# Patient Record
Sex: MALE | Race: WHITE | NOT HISPANIC OR LATINO | URBAN - NONMETROPOLITAN AREA
[De-identification: names, ages, dates, MRNs, and addresses within clinical notes are randomized per-mention and may not be internally consistent; named-entity substitution may affect disease eponyms.]

---

## 2021-03-19 ENCOUNTER — HOSPITAL ENCOUNTER (INPATIENT)
Facility: HOSPITAL | Age: 41
LOS: 2 days | Discharge: HOME | End: 2021-03-21
Attending: FAMILY MEDICINE | Admitting: FAMILY MEDICINE

## 2021-03-19 DIAGNOSIS — R13.10 DYSPHAGIA, UNSPECIFIED TYPE: Primary | ICD-10-CM

## 2021-03-19 DIAGNOSIS — R47.01 EXPRESSIVE APHASIA: ICD-10-CM

## 2021-03-20 ENCOUNTER — APPOINTMENT (OUTPATIENT)
Dept: ULTRASOUND IMAGING | Facility: HOSPITAL | Age: 41
End: 2021-03-20

## 2021-03-20 ENCOUNTER — APPOINTMENT (OUTPATIENT)
Dept: CARDIOLOGY | Facility: HOSPITAL | Age: 41
End: 2021-03-20

## 2021-03-20 ENCOUNTER — APPOINTMENT (OUTPATIENT)
Dept: MRI IMAGING | Facility: HOSPITAL | Age: 41
End: 2021-03-20

## 2021-03-20 PROBLEM — E78.2 MIXED HYPERLIPIDEMIA: Status: ACTIVE | Noted: 2021-03-20

## 2021-03-20 PROBLEM — E66.01 MORBID OBESITY (HCC): Status: ACTIVE | Noted: 2021-03-20

## 2021-03-20 PROBLEM — R47.01 EXPRESSIVE APHASIA: Status: ACTIVE | Noted: 2021-03-20

## 2021-03-20 PROBLEM — I63.9 STROKE (HCC): Status: ACTIVE | Noted: 2021-03-20

## 2021-03-20 LAB
ALBUMIN SERPL-MCNC: 3.7 G/DL (ref 3.5–5.2)
ALBUMIN/GLOB SERPL: 1.2 G/DL
ALP SERPL-CCNC: 51 U/L (ref 39–117)
ALT SERPL W P-5'-P-CCNC: 37 U/L (ref 1–41)
AMPHET+METHAMPHET UR QL: NEGATIVE
AMPHETAMINES UR QL: NEGATIVE
ANION GAP SERPL CALCULATED.3IONS-SCNC: 8 MMOL/L (ref 5–15)
AST SERPL-CCNC: 35 U/L (ref 1–40)
BARBITURATES UR QL SCN: NEGATIVE
BENZODIAZ UR QL SCN: NEGATIVE
BH CV ECHO MEAS - AO MAX PG (FULL): 1.6 MMHG
BH CV ECHO MEAS - AO MAX PG: 4.2 MMHG
BH CV ECHO MEAS - AO MEAN PG (FULL): 1 MMHG
BH CV ECHO MEAS - AO MEAN PG: 2 MMHG
BH CV ECHO MEAS - AO ROOT AREA (BSA CORRECTED): 1.2
BH CV ECHO MEAS - AO ROOT AREA: 9.1 CM^2
BH CV ECHO MEAS - AO ROOT DIAM: 3.4 CM
BH CV ECHO MEAS - AO V2 MAX: 103 CM/SEC
BH CV ECHO MEAS - AO V2 MEAN: 64.8 CM/SEC
BH CV ECHO MEAS - AO V2 VTI: 18.7 CM
BH CV ECHO MEAS - AVA(I,A): 2.4 CM^2
BH CV ECHO MEAS - AVA(I,D): 2.4 CM^2
BH CV ECHO MEAS - AVA(V,A): 3 CM^2
BH CV ECHO MEAS - AVA(V,D): 3 CM^2
BH CV ECHO MEAS - BSA(HAYCOCK): 3.1 M^2
BH CV ECHO MEAS - BSA: 2.9 M^2
BH CV ECHO MEAS - BZI_BMI: 46.9 KILOGRAMS/M^2
BH CV ECHO MEAS - BZI_METRIC_HEIGHT: 193 CM
BH CV ECHO MEAS - BZI_METRIC_WEIGHT: 174.6 KG
BH CV ECHO MEAS - EDV(CUBED): 99.9 ML
BH CV ECHO MEAS - EDV(MOD-SP4): 142 ML
BH CV ECHO MEAS - EDV(TEICH): 99.3 ML
BH CV ECHO MEAS - EF(CUBED): 72.7 %
BH CV ECHO MEAS - EF(MOD-SP4): 82.7 %
BH CV ECHO MEAS - EF(TEICH): 64.5 %
BH CV ECHO MEAS - ESV(CUBED): 27.3 ML
BH CV ECHO MEAS - ESV(MOD-SP4): 24.5 ML
BH CV ECHO MEAS - ESV(TEICH): 35.3 ML
BH CV ECHO MEAS - FS: 35.1 %
BH CV ECHO MEAS - IVS/LVPW: 1.8
BH CV ECHO MEAS - IVSD: 1.5 CM
BH CV ECHO MEAS - LA DIMENSION: 3.6 CM
BH CV ECHO MEAS - LA/AO: 1.1
BH CV ECHO MEAS - LAT PEAK E' VEL: 10 CM/SEC
BH CV ECHO MEAS - LV DIASTOLIC VOL/BSA (35-75): 48.5 ML/M^2
BH CV ECHO MEAS - LV MASS(C)D: 205.1 GRAMS
BH CV ECHO MEAS - LV MASS(C)DI: 70.1 GRAMS/M^2
BH CV ECHO MEAS - LV MAX PG: 2.7 MMHG
BH CV ECHO MEAS - LV MEAN PG: 1 MMHG
BH CV ECHO MEAS - LV SYSTOLIC VOL/BSA (12-30): 8.4 ML/M^2
BH CV ECHO MEAS - LV V1 MAX: 82 CM/SEC
BH CV ECHO MEAS - LV V1 MEAN: 52.4 CM/SEC
BH CV ECHO MEAS - LV V1 VTI: 11.8 CM
BH CV ECHO MEAS - LVIDD: 4.6 CM
BH CV ECHO MEAS - LVIDS: 3 CM
BH CV ECHO MEAS - LVLD AP4: 9 CM
BH CV ECHO MEAS - LVLS AP4: 6 CM
BH CV ECHO MEAS - LVOT AREA (M): 3.8 CM^2
BH CV ECHO MEAS - LVOT AREA: 3.8 CM^2
BH CV ECHO MEAS - LVOT DIAM: 2.2 CM
BH CV ECHO MEAS - LVPWD: 1.2 CM
BH CV ECHO MEAS - MED PEAK E' VEL: 8.49 CM/SEC
BH CV ECHO MEAS - MV A MAX VEL: 79.1 CM/SEC
BH CV ECHO MEAS - MV DEC TIME: 0.19 SEC
BH CV ECHO MEAS - MV E MAX VEL: 70.8 CM/SEC
BH CV ECHO MEAS - MV E/A: 0.9
BH CV ECHO MEAS - SI(AO): 58 ML/M^2
BH CV ECHO MEAS - SI(CUBED): 24.8 ML/M^2
BH CV ECHO MEAS - SI(LVOT): 15.3 ML/M^2
BH CV ECHO MEAS - SI(MOD-SP4): 40.1 ML/M^2
BH CV ECHO MEAS - SI(TEICH): 21.9 ML/M^2
BH CV ECHO MEAS - SV(AO): 169.8 ML
BH CV ECHO MEAS - SV(CUBED): 72.6 ML
BH CV ECHO MEAS - SV(LVOT): 44.9 ML
BH CV ECHO MEAS - SV(MOD-SP4): 117.5 ML
BH CV ECHO MEAS - SV(TEICH): 64 ML
BH CV ECHO MEAS - TR MAX VEL: 125 CM/SEC
BH CV ECHO MEASUREMENTS AVERAGE E/E' RATIO: 7.66
BILIRUB SERPL-MCNC: 0.7 MG/DL (ref 0–1.2)
BUN SERPL-MCNC: 11 MG/DL (ref 6–20)
BUN/CREAT SERPL: 11.7 (ref 7–25)
BUPRENORPHINE SERPL-MCNC: NEGATIVE NG/ML
CALCIUM SPEC-SCNC: 9 MG/DL (ref 8.6–10.5)
CANNABINOIDS SERPL QL: NEGATIVE
CHLORIDE SERPL-SCNC: 106 MMOL/L (ref 98–107)
CHOLEST SERPL-MCNC: 178 MG/DL (ref 0–200)
CO2 SERPL-SCNC: 27 MMOL/L (ref 22–29)
COCAINE UR QL: NEGATIVE
CREAT SERPL-MCNC: 0.94 MG/DL (ref 0.76–1.27)
DEPRECATED RDW RBC AUTO: 44.6 FL (ref 37–54)
ERYTHROCYTE [DISTWIDTH] IN BLOOD BY AUTOMATED COUNT: 13.8 % (ref 12.3–15.4)
FERRITIN SERPL-MCNC: 291.9 NG/ML (ref 30–400)
GFR SERPL CREATININE-BSD FRML MDRD: 89 ML/MIN/1.73
GLOBULIN UR ELPH-MCNC: 3 GM/DL
GLUCOSE BLDC GLUCOMTR-MCNC: 104 MG/DL (ref 70–130)
GLUCOSE SERPL-MCNC: 112 MG/DL (ref 65–99)
HBA1C MFR BLD: 6 % (ref 4.8–5.6)
HCT VFR BLD AUTO: 37.3 % (ref 37.5–51)
HDLC SERPL-MCNC: 29 MG/DL (ref 40–60)
HGB BLD-MCNC: 11.6 G/DL (ref 13–17.7)
IRON 24H UR-MRATE: 69 MCG/DL (ref 59–158)
IRON SATN MFR SERPL: 23 % (ref 20–50)
LDLC SERPL CALC-MCNC: 122 MG/DL (ref 0–100)
LDLC/HDLC SERPL: 4.12 {RATIO}
LEFT ATRIUM VOLUME INDEX: 19.6 ML/M2
LEFT ATRIUM VOLUME: 57.3 CM3
MAXIMAL PREDICTED HEART RATE: 181 BPM
MCH RBC QN AUTO: 27.5 PG (ref 26.6–33)
MCHC RBC AUTO-ENTMCNC: 31.1 G/DL (ref 31.5–35.7)
MCV RBC AUTO: 88.4 FL (ref 79–97)
METHADONE UR QL SCN: NEGATIVE
OPIATES UR QL: NEGATIVE
OXYCODONE UR QL SCN: NEGATIVE
PCP UR QL SCN: NEGATIVE
PLATELET # BLD AUTO: 204 10*3/MM3 (ref 140–450)
PMV BLD AUTO: 9.6 FL (ref 6–12)
POTASSIUM SERPL-SCNC: 4.2 MMOL/L (ref 3.5–5.2)
PROPOXYPH UR QL: NEGATIVE
PROT SERPL-MCNC: 6.7 G/DL (ref 6–8.5)
RBC # BLD AUTO: 4.22 10*6/MM3 (ref 4.14–5.8)
SODIUM SERPL-SCNC: 141 MMOL/L (ref 136–145)
STRESS TARGET HR: 154 BPM
TIBC SERPL-MCNC: 295 MCG/DL (ref 298–536)
TRANSFERRIN SERPL-MCNC: 198 MG/DL (ref 200–360)
TRICYCLICS UR QL SCN: NEGATIVE
TRIGL SERPL-MCNC: 147 MG/DL (ref 0–150)
TSH SERPL DL<=0.05 MIU/L-ACNC: 2.92 UIU/ML (ref 0.27–4.2)
VLDLC SERPL-MCNC: 27 MG/DL (ref 5–40)
WBC # BLD AUTO: 5.58 10*3/MM3 (ref 3.4–10.8)

## 2021-03-20 PROCEDURE — 85027 COMPLETE CBC AUTOMATED: CPT | Performed by: FAMILY MEDICINE

## 2021-03-20 PROCEDURE — 93880 EXTRACRANIAL BILAT STUDY: CPT

## 2021-03-20 PROCEDURE — 84466 ASSAY OF TRANSFERRIN: CPT | Performed by: PSYCHIATRY & NEUROLOGY

## 2021-03-20 PROCEDURE — 80061 LIPID PANEL: CPT | Performed by: FAMILY MEDICINE

## 2021-03-20 PROCEDURE — 93306 TTE W/DOPPLER COMPLETE: CPT

## 2021-03-20 PROCEDURE — 82962 GLUCOSE BLOOD TEST: CPT

## 2021-03-20 PROCEDURE — 93880 EXTRACRANIAL BILAT STUDY: CPT | Performed by: SURGERY

## 2021-03-20 PROCEDURE — 92610 EVALUATE SWALLOWING FUNCTION: CPT

## 2021-03-20 PROCEDURE — 99223 1ST HOSP IP/OBS HIGH 75: CPT | Performed by: PSYCHIATRY & NEUROLOGY

## 2021-03-20 PROCEDURE — 84443 ASSAY THYROID STIM HORMONE: CPT | Performed by: PSYCHIATRY & NEUROLOGY

## 2021-03-20 PROCEDURE — 83540 ASSAY OF IRON: CPT | Performed by: PSYCHIATRY & NEUROLOGY

## 2021-03-20 PROCEDURE — 83036 HEMOGLOBIN GLYCOSYLATED A1C: CPT | Performed by: FAMILY MEDICINE

## 2021-03-20 PROCEDURE — 93306 TTE W/DOPPLER COMPLETE: CPT | Performed by: INTERNAL MEDICINE

## 2021-03-20 PROCEDURE — 25010000002 PERFLUTREN 6.52 MG/ML SUSPENSION: Performed by: FAMILY MEDICINE

## 2021-03-20 PROCEDURE — 80306 DRUG TEST PRSMV INSTRMNT: CPT | Performed by: FAMILY MEDICINE

## 2021-03-20 PROCEDURE — 82728 ASSAY OF FERRITIN: CPT | Performed by: PSYCHIATRY & NEUROLOGY

## 2021-03-20 PROCEDURE — 80053 COMPREHEN METABOLIC PANEL: CPT | Performed by: FAMILY MEDICINE

## 2021-03-20 PROCEDURE — 70551 MRI BRAIN STEM W/O DYE: CPT

## 2021-03-20 RX ORDER — ASPIRIN 81 MG/1
81 TABLET, CHEWABLE ORAL DAILY
Status: DISCONTINUED | OUTPATIENT
Start: 2021-03-20 | End: 2021-03-21 | Stop reason: HOSPADM

## 2021-03-20 RX ORDER — ACETAMINOPHEN 650 MG/1
650 SUPPOSITORY RECTAL EVERY 4 HOURS PRN
Status: DISCONTINUED | OUTPATIENT
Start: 2021-03-20 | End: 2021-03-21 | Stop reason: HOSPADM

## 2021-03-20 RX ORDER — LABETALOL HYDROCHLORIDE 5 MG/ML
20 INJECTION, SOLUTION INTRAVENOUS
Status: DISCONTINUED | OUTPATIENT
Start: 2021-03-20 | End: 2021-03-21 | Stop reason: HOSPADM

## 2021-03-20 RX ORDER — SODIUM CHLORIDE 0.9 % (FLUSH) 0.9 %
10 SYRINGE (ML) INJECTION EVERY 12 HOURS SCHEDULED
Status: DISCONTINUED | OUTPATIENT
Start: 2021-03-20 | End: 2021-03-21 | Stop reason: HOSPADM

## 2021-03-20 RX ORDER — ACETAMINOPHEN 325 MG/1
650 TABLET ORAL EVERY 4 HOURS PRN
Status: DISCONTINUED | OUTPATIENT
Start: 2021-03-20 | End: 2021-03-21 | Stop reason: HOSPADM

## 2021-03-20 RX ORDER — SODIUM CHLORIDE 0.9 % (FLUSH) 0.9 %
10 SYRINGE (ML) INJECTION AS NEEDED
Status: DISCONTINUED | OUTPATIENT
Start: 2021-03-20 | End: 2021-03-21 | Stop reason: HOSPADM

## 2021-03-20 RX ORDER — ASPIRIN 300 MG/1
300 SUPPOSITORY RECTAL DAILY
Status: DISCONTINUED | OUTPATIENT
Start: 2021-03-20 | End: 2021-03-21 | Stop reason: HOSPADM

## 2021-03-20 RX ORDER — ATORVASTATIN CALCIUM 40 MG/1
80 TABLET, FILM COATED ORAL NIGHTLY
Status: DISCONTINUED | OUTPATIENT
Start: 2021-03-20 | End: 2021-03-21 | Stop reason: HOSPADM

## 2021-03-20 RX ADMIN — PERFLUTREN 1.17 MG: 6.52 INJECTION, SUSPENSION INTRAVENOUS at 14:07

## 2021-03-20 RX ADMIN — ASPIRIN 81 MG: 81 TABLET, CHEWABLE ORAL at 12:09

## 2021-03-20 RX ADMIN — SODIUM CHLORIDE, PRESERVATIVE FREE 10 ML: 5 INJECTION INTRAVENOUS at 12:09

## 2021-03-20 RX ADMIN — SODIUM CHLORIDE, PRESERVATIVE FREE 10 ML: 5 INJECTION INTRAVENOUS at 20:06

## 2021-03-20 RX ADMIN — SODIUM CHLORIDE, PRESERVATIVE FREE 10 ML: 5 INJECTION INTRAVENOUS at 01:38

## 2021-03-20 RX ADMIN — ATORVASTATIN CALCIUM 80 MG: 40 TABLET, FILM COATED ORAL at 20:06

## 2021-03-20 NOTE — PROGRESS NOTES
[facility] Medicine Services  INPATIENT PROGRESS NOTE    Length of Stay: 1  Date of Admission: 3/19/2021  Primary Care Physician: Provider, No Known    Subjective   Chief Complaint: Follow-up aphasia  HPI   Patient resting in bed.  He is able to converse and tells me he feels his word finding difficulty is doing better.  He still has some difficulty at times.  He feels as though he knows what he needs to say but can't get the right words out.  He denies chest pain.  He states he has had some shortness of breath at times.  He denies cough, congestion, or fever.  He denies drug use or smoking history.      Review of Systems  All pertinent negatives and positives are as above. All other systems have been reviewed and are negative unless otherwise stated.     Objective    Temp:  [97.7 °F (36.5 °C)-98.2 °F (36.8 °C)] 97.7 °F (36.5 °C)  Heart Rate:  [60-85] 71  Resp:  [18-20] 18  BP: (124-156)/(56-77) 150/76  Physical Exam  Vitals and nursing note reviewed.   Constitutional:       General: He is not in acute distress.     Appearance: He is obese. He is not ill-appearing.   HENT:      Head: Normocephalic and atraumatic.   Neck:      Comments: Sinus arrhythmia 39-59 with PVCs and first-degree  Cardiovascular:      Rate and Rhythm: Normal rate and regular rhythm.      Pulses: Normal pulses.      Heart sounds: Normal heart sounds.   Pulmonary:      Effort: Pulmonary effort is normal.      Breath sounds: Normal breath sounds. No wheezing, rhonchi or rales.   Abdominal:      General: Bowel sounds are normal. There is no distension.      Palpations: Abdomen is soft.      Tenderness: There is no abdominal tenderness.   Musculoskeletal:         General: No swelling or tenderness.      Cervical back: Normal range of motion and neck supple. No tenderness.   Skin:     General: Skin is warm and dry.      Findings: No erythema or rash.   Neurological:      Mental Status: He is alert.      Comments:  Right upper extremity distal weakness. Word finding difficulty at times.   Psychiatric:      Comments: flat     Results Review:  I have reviewed the labs, radiology results, and diagnostic studies.    Laboratory Data:   Results from last 7 days   Lab Units 03/20/21  0428   WBC 10*3/mm3 5.58   HEMOGLOBIN g/dL 11.6*   HEMATOCRIT % 37.3*   PLATELETS 10*3/mm3 204        Results from last 7 days   Lab Units 03/20/21  0428   SODIUM mmol/L 141   POTASSIUM mmol/L 4.2   CHLORIDE mmol/L 106   CO2 mmol/L 27.0   BUN mg/dL 11   CREATININE mg/dL 0.94   CALCIUM mg/dL 9.0   BILIRUBIN mg/dL 0.7   ALK PHOS U/L 51   ALT (SGPT) U/L 37   AST (SGOT) U/L 35   GLUCOSE mg/dL 112*     I have reviewed the patient current medications.     Assessment/Plan     Active Hospital Problems    Diagnosis    • Stroke (CMS/HCC) left internal capsule and periventricular white matter    • Morbid obesity (CMS/HCC)    • Expressive aphasia    • Mixed hyperlipidemia      Plan:  1.  The patient presented to [facility] emergency department on 3/19/2021 as he was having difficulty talking.  Symptoms started on the evening of 3/18.  The patient is doing work from out of town, and he is from [facility].  2.  CT of the head at the outlying facility reported low-density change at the left internal capsule and left periventricular white matter suspicious for acute infarct.  No hemorrhage.  Patient was transferred to our facility for higher level of care.  3.  We will order MRI of the brain without contrast.  Transthoracic echocardiogram and carotid ultrasound pending.  Continue aspirin and high intensity atorvastatin.  LDL cholesterol is 122, goal less than 70.  Hemoglobin A1c 6%.  Neurology consulted.  4.  Speech therapy recommended regular diet with thin liquids.  Planning to follow to complete a speech-language evaluation.  PT/OT to evaluate.  5.  Attempted to reach the patient's girlfriend via telephone to discuss plan of care, will try again later as her  phone was busy on multiple attempts.  6.  SCDs for DVT prophylaxis.    Discharge Planning: I expect the patient to be discharged to ? in ? days.    Electronically signed by MIGDALIA Samuel, 3/20/2021, 15:15 CDT.

## 2021-03-20 NOTE — THERAPY EVALUATION
Acute Care - Speech Language Pathology   Swallow Initial Evaluation [facility]     Patient Name: Ravi Lizama  : 1980  MRN: [id number]  Today's Date: 3/20/2021               Admit Date: 3/19/2021     Clinical bedside swallowing evaluation completed. Pt was alert, cooperative. Presented with non-fluent aphasia, though receptive language fx appeared WFL throughout evaluation. Able to answer yes/no questions to confirm name and date of birth, though he had difficulty verbalizing correct choice when presented two options given expressive aphasia. He was, however, able to correctly read aloud from pt safety board on wall, with mild delay. Mild R labial droop, appeared to have generally tight oral musculature, with generally reduced oral musculature ROM. Denied hx of dysphagia. Full range of consistencies except mechanical soft presented. Mildly decreased labial seal on spoon, though functional for bolus acceptance, as well as via straw. No noted concerns with oral prep, with functional mastication with regular solid despite mildly to moderately decreased mandibular ROM during mastication. No oral residue post swallow with regular solids. No noted concerns with laryngeal elevation. Consistent 1x multiple swallow throughout eval. No overt s/s of aspiration. Educated pt on general risks for aspiration, as well as SLP plan to continue to follow to complete speech-language evaluation. He was encouraged to utilize verbal communication as much as possible to assist in increased spontaneous recovery.   RECOMMENDATIONS:   • D/C NPO status, ok to start regular solid diet consistency with regular/thin liquid consistency  • meds whole with thin liquids  • general feeding/aspiration precautions  • RN to monitor for increased lung congestion  • ST to monitor diet toleration PRN, with plans to also eval for observed aphasia. Thanks!  Kita Bray, CCC-SLP 3/20/2021 09:00 CDT    Visit Dx:     ICD-10-CM ICD-9-CM   1.  Dysphagia, unspecified type  R13.10 787.20     Patient Active Problem List   Diagnosis   • Stroke (CMS/HCC) left internal capsule and periventricular white matter     History reviewed. No pertinent past medical history.  History reviewed. No pertinent surgical history.     SWALLOW EVALUATION (last 72 hours)      SLP Adult Swallow Evaluation     Row Name 03/20/21 0820                   Rehab Evaluation    Document Type  evaluation  -TM        Subjective Information  no complaints  -TM        Patient Observations  alert;cooperative  -TM        Patient/Family/Caregiver Comments/Observations  No family present.  -TM        Care Plan Review  evaluation/treatment results reviewed;care plan/treatment goals reviewed;risks/benefits reviewed;current/potential barriers reviewed  -TM        Patient Effort  adequate  -TM           General Information    Patient Profile Reviewed  yes  -TM        Current Method of Nutrition  NPO  -TM        Prior Level of Function-Communication  WFL  -TM        Prior Level of Function-Swallowing  no diet consistency restrictions  -TM        Plans/Goals Discussed with  patient  -TM        Barriers to Rehab  -- Aphasia   -TM        Patient's Goals for Discharge  patient did not state;patient could not state  -TM           Pain    Additional Documentation  Pain Scale: Numbers Pre/Post-Treatment (Group)  -TM           Pain Scale: Numbers Pre/Post-Treatment    Pretreatment Pain Rating  0/10 - no pain  -TM        Posttreatment Pain Rating  0/10 - no pain  -TM           Oral Motor Structure and Function    Dentition Assessment  natural, present and adequate  -TM        Secretion Management  WNL/WFL  -TM        Mucosal Quality  moist, healthy  -TM        Volitional Swallow  WFL  -TM        Volitional Cough  WFL  -TM           Oral Musculature and Cranial Nerve Assessment    Oral Motor General Assessment  generalized oral motor weakness;oral labial or buccal impairment  -TM        Mandibular Impairment  Detail, Cranial Nerve V (Trigeminal)  reduced mandibular ROM;reduced strength bilaterally  -TM        Oral Labial or Buccal Impairment, Detail, Cranial Nerve VII (Facial):  right labial droop  -TM        Lingual Impairment, Detail. Cranial Nerves IX, XII (Glossopharyngeal and Hypoglossal)  reduced lingual ROM;reduced strength  -TM           General Eating/Swallowing Observations    Respiratory Support Currently in Use  room air  -TM        Eating/Swallowing Skills  fed by SLP  -TM        Positioning During Eating  upright 90 degree  -TM        Utensils Used  spoon;straw  -TM        Consistencies Trialed  pudding thick;honey-thick liquids;nectar/syrup-thick liquids;thin liquids;regular textures  -TM           Respiratory    Respiratory Status  WFL  -TM           Clinical Swallow Eval    Oral Prep Phase  WFL  -TM        Oral Transit  WFL  -TM        Oral Residue  WFL  -TM        Pharyngeal Phase  WFL  -TM        Esophageal Phase  unremarkable  -TM        Clinical Swallow Evaluation Summary  See note.  -TM           Clinical Impression    SLP Swallowing Diagnosis  functional oral phase;functional pharyngeal phase  -TM        Functional Impact  risk of aspiration/pneumonia  -TM        Rehab Potential/Prognosis, Swallowing  good, to achieve stated therapy goals  -TM        Swallow Criteria for Skilled Therapeutic Interventions Met  demonstrates skilled criteria  -TM           Recommendations    Therapy Frequency (Swallow)  PRN  -TM        Predicted Duration Therapy Intervention (Days)  until discharge  -TM        SLP Diet Recommendation  regular textures;thin liquids  -TM        Recommended Precautions and Strategies  upright posture during/after eating;small bites of food and sips of liquid  -TM        Oral Care Recommendations  Oral Care BID/PRN  -TM        SLP Rec. for Method of Medication Administration  meds whole;with thin liquids  -TM        Monitor for Signs of Aspiration  yes;notify SLP if any  concerns;cough;gurgly voice;throat clearing;pneumonia;right lower lobe infiltrates  -TM        Anticipated Discharge Disposition (SLP)  unknown  -TM           Swallow Goals (SLP)    Oral Nutrition/Hydration Goal Selection (SLP)  oral nutrition/hydration, SLP goal 1  -TM           Oral Nutrition/Hydration Goal 1 (SLP)    Oral Nutrition/Hydration Goal 1, SLP  Pt will tolerate LRD without overt s/s of aspiration.  -TM        Time Frame (Oral Nutrition/Hydration Goal 1, SLP)  by discharge  -TM        Barriers (Oral Nutrition/Hydration Goal 1, SLP)  n/a  -TM        Progress/Outcomes (Oral Nutrition/Hydration Goal 1, SLP)  goal ongoing  -TM          User Key  (r) = Recorded By, (t) = Taken By, (c) = Cosigned By    Initials Name Effective Dates    TM Kita Bray CCC-SLP 08/02/16 -           EDUCATION  The patient has been educated in the following areas:   Dysphagia (Swallowing Impairment).    SLP Recommendation and Plan  SLP Swallowing Diagnosis: functional oral phase, functional pharyngeal phase  SLP Diet Recommendation: regular textures, thin liquids  Recommended Precautions and Strategies: upright posture during/after eating, small bites of food and sips of liquid  SLP Rec. for Method of Medication Administration: meds whole, with thin liquids     Monitor for Signs of Aspiration: yes, notify SLP if any concerns, cough, gurgly voice, throat clearing, pneumonia, right lower lobe infiltrates     Swallow Criteria for Skilled Therapeutic Interventions Met: demonstrates skilled criteria  Anticipated Discharge Disposition (SLP): unknown  Rehab Potential/Prognosis, Swallowing: good, to achieve stated therapy goals  Therapy Frequency (Swallow): PRN  Predicted Duration Therapy Intervention (Days): until discharge                         Plan of Care Reviewed With: patient  Progress:  (Eval)  Outcome Summary: Clinical bedside swallowing evaluation completed. Pt was alert, cooperative. Presented with non-fluent aphasia,  though receptive language fx appeared WFL throughout evaluation. Able to answer yes/no questions to confirm name and date of birth, though he had difficulty verbalizing correct choice when presented two options given expressive aphasia. He was, however, able to correctly read aloud from pt safety board on wall, with mild delay. Mild R labial droop, appeared to have generally tight oral musculature, with generally reduced oral musculature ROM. Denied hx of dysphagia. Full range of consistencies except mechanical soft presented. Mildly decreased labial seal on spoon, though functional for bolus acceptance, as well as via straw. No noted concerns with oral prep, with functional mastication with regular solid despite mildly to moderately decreased mandibular ROM during mastication. No oral residue post swallow with regular solids. No noted concerns with laryngeal elevation. Consistent 1x multiple swallow throughout eval. No overt s/s of aspiration. Educated pt on general risks for aspiration, as well as SLP plan to continue to follow to complete speech-language evaluation. He was encouraged to utilize verbal communication as much as possible to assist in increased spontaneous recovery. RECOMMENDATIONS: D/C NPO status, ok to start regular solid diet consistency with regular/thin liquid consistency; meds whole with thin liquids; general feeding/aspiration precautions; RN to monitor for increased lung congestion; ST to monitor diet toleration PRN, with plans to also eval for observed aphasia. Thanks!    SLP GOALS     Row Name 03/20/21 0820             Oral Nutrition/Hydration Goal 1 (SLP)    Oral Nutrition/Hydration Goal 1, SLP  Pt will tolerate LRD without overt s/s of aspiration.  -TM      Time Frame (Oral Nutrition/Hydration Goal 1, SLP)  by discharge  -TM      Barriers (Oral Nutrition/Hydration Goal 1, SLP)  n/a  -TM      Progress/Outcomes (Oral Nutrition/Hydration Goal 1, SLP)  goal ongoing  -TM        User Key  (r) =  Recorded By, (t) = Taken By, (c) = Cosigned By    Initials Name Provider Type     Kita Bray CCC-SLP Speech and Language Pathologist             Time Calculation:   Time Calculation- SLP     Row Name 03/20/21 0859             Time Calculation- SLP    SLP Start Time  0820  -TM      SLP Stop Time  0903  -TM      SLP Time Calculation (min)  43 min  -TM      SLP Received On  03/20/21  -      SLP Goal Re-Cert Due Date  03/30/21  -        User Key  (r) = Recorded By, (t) = Taken By, (c) = Cosigned By    Initials Name Provider Type     Kita Bray CCC-SLP Speech and Language Pathologist          Therapy Charges for Today     Code Description Service Date Service Provider Modifiers Qty    [id number]  ST EVAL ORAL PHARYNG SWALLOW 3 3/20/2021 Kita Bray CCC-SLP GN 1               JOSE Cavanaugh  3/20/2021

## 2021-03-20 NOTE — PLAN OF CARE
Goal Outcome Evaluation:  Plan of Care Reviewed With: patient  Progress: no change  Outcome Summary: Pt transfer from [facility] with CVA. NIH 5 due to severe aphasia, however pt able to answer yes and no questions appropriately. NPO due to failed swallow screen with R facial droop. NSR to sinus sarahi on telemetry. 2L O2 while asleep. Echo and carotids planned for today. Safety maintained, will continue to monitor.

## 2021-03-20 NOTE — PLAN OF CARE
Problem: Adult Inpatient Plan of Care  Goal: Plan of Care Review  Outcome: Ongoing, Progressing  Flowsheets (Taken 3/20/2021 0820)  Progress: (Eval) --  Plan of Care Reviewed With: patient  Outcome Summary:  Clinical bedside swallowing evaluation completed. Pt was alert, cooperative. Presented with non-fluent aphasia, though receptive language fx appeared WFL throughout evaluation. Able to answer yes/no questions to confirm name and date of birth, though he had difficulty verbalizing correct choice when presented two options given expressive aphasia. He was, however, able to correctly read aloud from pt safety board on wall, with mild delay. Mild R labial droop, appeared to have generally tight oral musculature, with generally reduced oral musculature ROM. Denied hx of dysphagia. Full range of consistencies except mechanical soft presented. Mildly decreased labial seal on spoon, though functional for bolus acceptance, as well as via straw. No noted concerns with oral prep, with functional mastication with regular solid despite mildly to moderately decreased mandibular ROM during mastication. No oral residue post swallow with regular solids. No noted concerns with laryngeal elevation. Consistent 1x multiple swallow throughout eval. No overt s/s of aspiration. Educated pt on general risks for aspiration, as well as SLP plan to continue to follow to complete speech-language evaluation. He was encouraged to utilize verbal communication as much as possible to assist in increased spontaneous recovery. RECOMMENDATIONS:    D/C NPO status, ok to start regular solid diet consistency with regular/thin liquid consistency   meds whole with thin liquids   general feeding/aspiration precautions   RN to monitor for increased lung congestion   ST to monitor diet toleration PRN, with plans to also eval for observed aphasia. Thanks!

## 2021-03-20 NOTE — H&P
[facility] Medicine Services  HISTORY AND PHYSICAL    Date of Admission: 3/19/2021  Primary Care Physician: Provider, No Known    Subjective     Chief Complaint: CVA    History of Present Illness  36 year old male with no significant past medical history that presented to [facility] tonight with complaints of difficulty talking. He drove himself there, he has symptoms since about 10 PM on 3/18/2021. Blood work was unremarkable, CT head was reported as low density left internal capsule and left periventricular white matter suspicious for acute stroke. Case was discussed with our neurologist and the patient was accepted in transfer for evaluation.    He is still having trouble producing words and is able to answer yes or no and some questions with one word answer.     Review of Systems   Otherwise complete ROS reviewed and negative except as mentioned in the HPI.    Past Medical History: No past medical history on file. Reviewed, no significant past medical history.  Past Surgical History:No past surgical history on file. Reviewed, no significant past surgical history.   Social History:  No smoking or drinking    Family History: family history is not on file.   No history of stroke or thromboembolic disease at early age.     Allergies:  Not on File     Medications:  Prior to Admission medications    Not on File     Objective     Vital Signs: /67 (BP Location: Right arm, Patient Position: Lying)   Pulse 67   Temp 98 °F (36.7 °C) (Oral)   Resp 20   Wt (!) 175 kg (385 lb 9.6 oz)   SpO2 98%   Physical Exam  Constitutional:       Appearance: He is obese. He is not ill-appearing, toxic-appearing or diaphoretic.   HENT:      Head: Normocephalic and atraumatic.      Right Ear: External ear normal.      Left Ear: External ear normal.      Nose: Nose normal.      Mouth/Throat:      Mouth: Mucous membranes are dry.      Pharynx: No oropharyngeal exudate.   Eyes:      General:          Right eye: No discharge.         Left eye: No discharge.      Extraocular Movements: Extraocular movements intact.      Conjunctiva/sclera: Conjunctivae normal.      Pupils: Pupils are equal, round, and reactive to light.   Cardiovascular:      Rate and Rhythm: Normal rate and regular rhythm.      Pulses: Normal pulses.      Heart sounds: No murmur heard.     Pulmonary:      Effort: Pulmonary effort is normal. No respiratory distress.      Breath sounds: Normal breath sounds. No stridor. No wheezing or rhonchi.   Abdominal:      General: Bowel sounds are normal. There is no distension.      Palpations: Abdomen is soft. There is no mass.      Tenderness: There is no abdominal tenderness.      Hernia: No hernia is present.   Musculoskeletal:         General: No swelling or tenderness. Normal range of motion.      Cervical back: Normal range of motion and neck supple. No rigidity or tenderness.      Right lower leg: No edema.      Left lower leg: No edema.   Skin:     General: Skin is dry.      Capillary Refill: Capillary refill takes less than 2 seconds.      Coloration: Skin is not pale.      Findings: No erythema or rash.   Neurological:      Mental Status: He is alert and oriented to person, place, and time.      Sensory: No sensory deficit.      Coordination: Coordination normal.      Comments: Speech impaired with difficulty verbalizing complex words or phrases   Psychiatric:         Mood and Affect: Mood normal.         Behavior: Behavior normal.     Results Reviewed:  Lab Results (last 24 hours)     Procedure Component Value Units Date/Time    Urine Drug Screen - Urine, Clean Catch [[id number]]  (Normal) Collected: 03/20/21 0628    Specimen: Urine, Clean Catch Updated: 03/20/21 0648     THC, Screen, Urine Negative     Phencyclidine (PCP), Urine Negative     Cocaine Screen, Urine Negative     Methamphetamine, Ur Negative     Opiate Screen Negative     Amphetamine Screen, Urine Negative     Benzodiazepine Screen,  Urine Negative     Tricyclic Antidepressants Screen Negative     Methadone Screen, Urine Negative     Barbiturates Screen, Urine Negative     Oxycodone Screen, Urine Negative     Propoxyphene Screen Negative     Buprenorphine, Screen, Urine Negative    Narrative:      Cutoff For Drugs Screened:    Amphetamines               500 ng/ml  Barbiturates               200 ng/ml  Benzodiazepines            150 ng/ml  Cocaine                    150 ng/ml  Methadone                  200 ng/ml  Opiates                    100 ng/ml  Phencyclidine               25 ng/ml  THC                            50 ng/ml  Methamphetamine            500 ng/ml  Tricyclic Antidepressants  300 ng/ml  Oxycodone                  100 ng/ml  Propoxyphene               300 ng/ml  Buprenorphine               10 ng/ml    The normal value for all drugs tested is negative. This report includes unconfirmed screening results, with the cutoff values listed, to be used for medical treatment purposes only.  Unconfirmed results must not be used for non-medical purposes such as employment or legal testing.  Clinical consideration should be applied to any drug of abuse test, particularly when unconfirmed results are used.      Comprehensive Metabolic Panel [[id number]]  (Abnormal) Collected: 03/20/21 0428    Specimen: Blood Updated: 03/20/21 0601     Glucose 112 mg/dL      BUN 11 mg/dL      Creatinine 0.94 mg/dL      Sodium 141 mmol/L      Potassium 4.2 mmol/L      Comment: Slight hemolysis detected by analyzer. Results may be affected.        Chloride 106 mmol/L      CO2 27.0 mmol/L      Calcium 9.0 mg/dL      Total Protein 6.7 g/dL      Albumin 3.70 g/dL      ALT (SGPT) 37 U/L      AST (SGOT) 35 U/L      Comment: Slight hemolysis detected by analyzer. Results may be affected.        Alkaline Phosphatase 51 U/L      Total Bilirubin 0.7 mg/dL      eGFR Non African Amer 89 mL/min/1.73      Globulin 3.0 gm/dL      A/G Ratio 1.2 g/dL      BUN/Creatinine Ratio 11.7      Anion Gap 8.0 mmol/L     Narrative:      GFR Normal >60  Chronic Kidney Disease <60  Kidney Failure <15      Lipid Panel [[id number]]  (Abnormal) Collected: 03/20/21 0428    Specimen: Blood Updated: 03/20/21 0534     Total Cholesterol 178 mg/dL      Triglycerides 147 mg/dL      HDL Cholesterol 29 mg/dL      LDL Cholesterol  122 mg/dL      VLDL Cholesterol 27 mg/dL      LDL/HDL Ratio 4.12    Narrative:      Cholesterol Reference Ranges  (U.[place] Department of Health and Human Services ATP III Classifications)    Desirable          <200 mg/dL  Borderline High    200-239 mg/dL  High Risk          >240 mg/dL      Triglyceride Reference Ranges  (U.S. Department of Health and Human Services ATP III Classifications)    Normal           <150 mg/dL  Borderline High  150-199 mg/dL  High             200-499 mg/dL  Very High        >500 mg/dL    HDL Reference Ranges  (U.S. Department of Health and Human Services ATP III Classifcations)    Low     <40 mg/dl (major risk factor for CHD)  High    >60 mg/dl ('negative' risk factor for CHD)        LDL Reference Ranges  (U.S. Department of Health and Human Services ATP III Classifcations)    Optimal          <100 mg/dL  Near Optimal     100-129 mg/dL  Borderline High  130-159 mg/dL  High             160-189 mg/dL  Very High        >189 mg/dL    Hemoglobin A1c [[id number]]  (Abnormal) Collected: 03/20/21 0428    Specimen: Blood Updated: 03/20/21 0522     Hemoglobin A1C 6.00 %     Narrative:      Hemoglobin A1C Ranges:    Increased Risk for Diabetes  5.7% to 6.4%  Diabetes                     >= 6.5%  Diabetic Goal                < 7.0%    CBC (No Diff) [[id number]]  (Abnormal) Collected: 03/20/21 0428    Specimen: Blood Updated: 03/20/21 0514     WBC 5.58 10*3/mm3      RBC 4.22 10*6/mm3      Hemoglobin 11.6 g/dL      Hematocrit 37.3 %      MCV 88.4 fL      MCH 27.5 pg      MCHC 31.1 g/dL      RDW 13.8 %      RDW-SD 44.6 fl      MPV 9.6 fL      Platelets 204 10*3/mm3     POC Glucose Once  [[id number]]  (Normal) Collected: 03/20/21 0147    Specimen: Blood Updated: 03/20/21 0158     Glucose 104 mg/dL      Comment: : PRISCILLA [employer] ID: [id number]           Imaging Results (Last 24 Hours)     ** No results found for the last 24 hours. **        I have personally reviewed and interpreted the radiology studies and ECG obtained at time of admission.     Assessment / Plan     Assessment:   Active Hospital Problems    Diagnosis    • Stroke (CMS/HCC) left internal capsule and periventricular white matter      Plan:   Admit to neuro floor  Vitals and neuro checks every 4 hours  Npo diet until evaluated by bedside swallowing and/or SLP  PT/OT evaluations  CBC, BMP, A1C, lipid panel    ASA 81 mg daily  Echocardiogram  Carotid doppler  Neurology consult      DVT prophylaxis > SCD    Code Status: Full     I discussed the patient's findings and my recommendations with the patient    Estimated length of stay over 2 midnights    Patient seen and examined by me on 3/19/2020 at 2355.    Electronically signed by Gerardo Nicholson MD, 03/20/21, 07:26 CDT.

## 2021-03-21 ENCOUNTER — APPOINTMENT (OUTPATIENT)
Dept: CT IMAGING | Facility: HOSPITAL | Age: 41
End: 2021-03-21

## 2021-03-21 ENCOUNTER — APPOINTMENT (OUTPATIENT)
Dept: CARDIOLOGY | Facility: HOSPITAL | Age: 41
End: 2021-03-21

## 2021-03-21 VITALS
OXYGEN SATURATION: 97 % | SYSTOLIC BLOOD PRESSURE: 160 MMHG | HEART RATE: 65 BPM | TEMPERATURE: 97.9 F | DIASTOLIC BLOOD PRESSURE: 88 MMHG | WEIGHT: 315 LBS | RESPIRATION RATE: 18 BRPM

## 2021-03-21 LAB
AT III PPP CHRO-ACNC: 99 % (ref 84–123)
D DIMER PPP FEU-MCNC: 0.25 MG/L (FEU) (ref 0–0.5)
FOLATE SERPL-MCNC: 5.76 NG/ML (ref 4.78–24.2)
HCYS SERPL-MCNC: 10.9 UMOL/L (ref 0–15)
VIT B12 BLD-MCNC: 362 PG/ML (ref 211–946)

## 2021-03-21 PROCEDURE — 85306 CLOT INHIBIT PROT S FREE: CPT | Performed by: PSYCHIATRY & NEUROLOGY

## 2021-03-21 PROCEDURE — 85613 RUSSELL VIPER VENOM DILUTED: CPT | Performed by: PSYCHIATRY & NEUROLOGY

## 2021-03-21 PROCEDURE — 85670 THROMBIN TIME PLASMA: CPT | Performed by: PSYCHIATRY & NEUROLOGY

## 2021-03-21 PROCEDURE — 70498 CT ANGIOGRAPHY NECK: CPT

## 2021-03-21 PROCEDURE — 86147 CARDIOLIPIN ANTIBODY EA IG: CPT | Performed by: PSYCHIATRY & NEUROLOGY

## 2021-03-21 PROCEDURE — 85302 CLOT INHIBIT PROT C ANTIGEN: CPT | Performed by: PSYCHIATRY & NEUROLOGY

## 2021-03-21 PROCEDURE — 85705 THROMBOPLASTIN INHIBITION: CPT | Performed by: PSYCHIATRY & NEUROLOGY

## 2021-03-21 PROCEDURE — 85305 CLOT INHIBIT PROT S TOTAL: CPT | Performed by: PSYCHIATRY & NEUROLOGY

## 2021-03-21 PROCEDURE — 85300 ANTITHROMBIN III ACTIVITY: CPT | Performed by: PSYCHIATRY & NEUROLOGY

## 2021-03-21 PROCEDURE — 86038 ANTINUCLEAR ANTIBODIES: CPT | Performed by: PSYCHIATRY & NEUROLOGY

## 2021-03-21 PROCEDURE — 83090 ASSAY OF HOMOCYSTEINE: CPT | Performed by: PSYCHIATRY & NEUROLOGY

## 2021-03-21 PROCEDURE — 82746 ASSAY OF FOLIC ACID SERUM: CPT | Performed by: PSYCHIATRY & NEUROLOGY

## 2021-03-21 PROCEDURE — 0 IOPAMIDOL PER 1 ML: Performed by: FAMILY MEDICINE

## 2021-03-21 PROCEDURE — 82607 VITAMIN B-12: CPT | Performed by: PSYCHIATRY & NEUROLOGY

## 2021-03-21 PROCEDURE — 85303 CLOT INHIBIT PROT C ACTIVITY: CPT | Performed by: PSYCHIATRY & NEUROLOGY

## 2021-03-21 PROCEDURE — 85379 FIBRIN DEGRADATION QUANT: CPT | Performed by: PSYCHIATRY & NEUROLOGY

## 2021-03-21 PROCEDURE — 81241 F5 GENE: CPT | Performed by: PSYCHIATRY & NEUROLOGY

## 2021-03-21 PROCEDURE — 99233 SBSQ HOSP IP/OBS HIGH 50: CPT | Performed by: PSYCHIATRY & NEUROLOGY

## 2021-03-21 PROCEDURE — 85732 THROMBOPLASTIN TIME PARTIAL: CPT | Performed by: PSYCHIATRY & NEUROLOGY

## 2021-03-21 PROCEDURE — 25010000002 CYANOCOBALAMIN PER 1000 MCG: Performed by: PSYCHIATRY & NEUROLOGY

## 2021-03-21 PROCEDURE — 86146 BETA-2 GLYCOPROTEIN ANTIBODY: CPT | Performed by: PSYCHIATRY & NEUROLOGY

## 2021-03-21 PROCEDURE — 81240 F2 GENE: CPT | Performed by: PSYCHIATRY & NEUROLOGY

## 2021-03-21 PROCEDURE — 70496 CT ANGIOGRAPHY HEAD: CPT

## 2021-03-21 RX ORDER — ATORVASTATIN CALCIUM 80 MG/1
80 TABLET, FILM COATED ORAL NIGHTLY
Qty: 30 TABLET | Refills: 3 | Status: SHIPPED | OUTPATIENT
Start: 2021-03-21 | End: 2021-03-21

## 2021-03-21 RX ORDER — FOLIC ACID 1 MG/1
5 TABLET ORAL ONCE
Status: COMPLETED | OUTPATIENT
Start: 2021-03-21 | End: 2021-03-21

## 2021-03-21 RX ORDER — ATORVASTATIN CALCIUM 80 MG/1
80 TABLET, FILM COATED ORAL NIGHTLY
Qty: 30 TABLET | Refills: 3 | Status: SHIPPED | OUTPATIENT
Start: 2021-03-21

## 2021-03-21 RX ORDER — CYANOCOBALAMIN 1000 UG/ML
1000 INJECTION, SOLUTION INTRAMUSCULAR; SUBCUTANEOUS ONCE
Status: COMPLETED | OUTPATIENT
Start: 2021-03-21 | End: 2021-03-21

## 2021-03-21 RX ORDER — ASPIRIN 81 MG/1
81 TABLET, CHEWABLE ORAL DAILY
Start: 2021-03-22

## 2021-03-21 RX ORDER — ASPIRIN 81 MG/1
81 TABLET, CHEWABLE ORAL DAILY
Start: 2021-03-22 | End: 2021-03-21

## 2021-03-21 RX ADMIN — SODIUM CHLORIDE, PRESERVATIVE FREE 10 ML: 5 INJECTION INTRAVENOUS at 08:58

## 2021-03-21 RX ADMIN — IOPAMIDOL 125 ML: 755 INJECTION, SOLUTION INTRAVENOUS at 08:54

## 2021-03-21 RX ADMIN — FOLIC ACID 5 MG: 1 TABLET ORAL at 16:29

## 2021-03-21 RX ADMIN — CYANOCOBALAMIN 1000 MCG: 1000 INJECTION, SOLUTION INTRAMUSCULAR at 16:30

## 2021-03-21 RX ADMIN — ASPIRIN 81 MG: 81 TABLET, CHEWABLE ORAL at 08:58

## 2021-03-21 NOTE — DISCHARGE SUMMARY
[facility]  DISCHARGE SUMMARY       Date of Admission: 3/19/2021  Date of Discharge:  3/21/2021  Primary Care Physician: Provider, No Known    Presenting Problem/History of Present Illness:  Transfer from outlying facility for concern of stroke on imaging    Final Discharge Diagnoses:  Active Hospital Problems    Diagnosis    • Stroke (CMS/HCC) left internal capsule and periventricular white matter    • Morbid obesity (CMS/HCC)    • Expressive aphasia    • Mixed hyperlipidemia      Consults:   1.  Neurology    Procedures Performed: None    Pertinent Test Results:   Lab Results (all)     Procedure Component Value Units Date/Time    Vitamin B12 [[id number]]  (Normal) Collected: 03/21/21 0536    Specimen: Blood Updated: 03/21/21 1221     Vitamin B-12 362 pg/mL     Narrative:      Results may be falsely increased if patient taking Biotin.      Folate [[id number]]  (Normal) Collected: 03/21/21 0536    Specimen: Blood Updated: 03/21/21 1221     Folate 5.76 ng/mL     Narrative:      Results may be falsely increased if patient taking Biotin.      Homocysteine [[id number]]  (Normal) Collected: 03/21/21 0536    Specimen: Blood Updated: 03/21/21 1213     Homocysteine, Plasma (Quant) 10.9 umol/L     Antithrombin III [[id number]]  (Normal) Collected: 03/21/21 0536    Specimen: Blood Updated: 03/21/21 0623     Antithrombin Activity 99 %     D-dimer, Quantitative [[id number]]  (Normal) Collected: 03/21/21 0536    Specimen: Blood Updated: 03/21/21 0615     D-Dimer, Quantitative 0.25 mg/L (FEU)     Narrative:      Reference Range is 0-0.50 mg/L FEU. However, results <0.50 mg/L FEU tends to rule out DVT or PE. Results >0.50 mg/L FEU are not useful in predicting absence or presence of DVT or PE.      Factor 5 Leiden [[id number]] Collected: 03/21/21 0536    Specimen: Blood Updated: 03/21/21 0544    Factor II, DNA Analysis [[id number]] Collected: 03/21/21 0536    Specimen: Blood Updated:  03/21/21 0544    Protein S Functional [[id number]] Collected: 03/21/21 0536    Specimen: Blood Updated: 03/21/21 0544    Protein C Activity [[id number]] Collected: 03/21/21 0536    Specimen: Blood Updated: 03/21/21 0544    Protein S Antigen, Free [[id number]] Collected: 03/21/21 0536    Specimen: Blood Updated: 03/21/21 0544    Protein S Antigen, Total [[id number]] Collected: 03/21/21 0536    Specimen: Blood Updated: 03/21/21 0544    Protein C Antigen, Total [[id number]] Collected: 03/21/21 0536    Specimen: Blood Updated: 03/21/21 0543    Lupus Anticoagulant [[id number]] Collected: 03/21/21 0536    Specimen: Blood Updated: 03/21/21 0543    Beta-2 Glycoprotein Antibodies [[id number]] Collected: 03/21/21 0536    Specimen: Blood Updated: 03/21/21 0543    Anticardiolipin Antibody, IgG / M, Qn [[id number]] Collected: 03/21/21 0536    Specimen: Blood Updated: 03/21/21 0543    ISIDRA [[id number]] Collected: 03/21/21 0536    Specimen: Blood Updated: 03/21/21 0543    Iron Profile [[id number]]  (Abnormal) Collected: 03/20/21 0428    Specimen: Blood Updated: 03/20/21 1649     Iron 69 mcg/dL      Iron Saturation 23 %      Transferrin 198 mg/dL      TIBC 295 mcg/dL     Ferritin [[id number]]  (Normal) Collected: 03/20/21 0428    Specimen: Blood Updated: 03/20/21 1649     Ferritin 291.90 ng/mL     Narrative:      Results may be falsely decreased if patient taking Biotin.      TSH [[id number]]  (Normal) Collected: 03/20/21 0428    Specimen: Blood Updated: 03/20/21 1649     TSH 2.920 uIU/mL     Urine Drug Screen - Urine, Clean Catch [[id number]]  (Normal) Collected: 03/20/21 0628    Specimen: Urine, Clean Catch Updated: 03/20/21 0648     THC, Screen, Urine Negative     Phencyclidine (PCP), Urine Negative     Cocaine Screen, Urine Negative     Methamphetamine, Ur Negative     Opiate Screen Negative     Amphetamine Screen, Urine Negative     Benzodiazepine Screen, Urine Negative     Tricyclic Antidepressants Screen Negative     Methadone Screen,  Urine Negative     Barbiturates Screen, Urine Negative     Oxycodone Screen, Urine Negative     Propoxyphene Screen Negative     Buprenorphine, Screen, Urine Negative    Narrative:      Comprehensive Metabolic Panel [[id number]]  (Abnormal) Collected: 03/20/21 0428    Specimen: Blood Updated: 03/20/21 0601     Glucose 112 mg/dL      BUN 11 mg/dL      Creatinine 0.94 mg/dL      Sodium 141 mmol/L      Potassium 4.2 mmol/L      Comment: Slight hemolysis detected by analyzer. Results may be affected.        Chloride 106 mmol/L      CO2 27.0 mmol/L      Calcium 9.0 mg/dL      Total Protein 6.7 g/dL      Albumin 3.70 g/dL      ALT (SGPT) 37 U/L      AST (SGOT) 35 U/L      Comment: Slight hemolysis detected by analyzer. Results may be affected.        Alkaline Phosphatase 51 U/L      Total Bilirubin 0.7 mg/dL      eGFR Non African Amer 89 mL/min/1.73      Globulin 3.0 gm/dL      A/G Ratio 1.2 g/dL      BUN/Creatinine Ratio 11.7     Anion Gap 8.0 mmol/L     Lipid Panel [[id number]]  (Abnormal) Collected: 03/20/21 0428    Specimen: Blood Updated: 03/20/21 0534     Total Cholesterol 178 mg/dL      Triglycerides 147 mg/dL      HDL Cholesterol 29 mg/dL      LDL Cholesterol  122 mg/dL      VLDL Cholesterol 27 mg/dL      LDL/HDL Ratio 4.12    Narrative:      Hemoglobin A1c [[id number]]  (Abnormal) Collected: 03/20/21 0428    Specimen: Blood Updated: 03/20/21 0522     Hemoglobin A1C 6.00 %     CBC (No Diff) [[id number]]  (Abnormal) Collected: 03/20/21 0428    Specimen: Blood Updated: 03/20/21 0514     WBC 5.58 10*3/mm3      RBC 4.22 10*6/mm3      Hemoglobin 11.6 g/dL      Hematocrit 37.3 %      MCV 88.4 fL      MCH 27.5 pg      MCHC 31.1 g/dL      RDW 13.8 %      RDW-SD 44.6 fl      MPV 9.6 fL      Platelets 204 10*3/mm3     POC Glucose Once [[id number]]  (Normal) Collected: 03/20/21 0147    Specimen: Blood Updated: 03/20/21 0158     Glucose 104 mg/dL      Comment: : PRISCILLA CottonphyllisMeter ID: [id number]           Imaging  Results (All)     Procedure Component Value Units Date/Time    CT Angiogram Neck [[id number]] Collected: 03/21/21 0904     Updated: 03/21/21 0912    Narrative:      EXAM: CT ANGIOGRAM NECK- - 3/21/2021 8:44 AM CDT     HISTORY: Carotid artery stenosis; R13.10-Dysphagia, unspecified       COMPARISON: 3/28/2021.      DOSE LENGTH PRODUCT: 222 mGy cm. Automated exposure control was also  utilized to decrease patient radiation dose.     TECHNIQUE: CT images of the neck performed with contrast. 3D  postprocessing, including MIPs, performed and images saved to PACS.     Grading of carotid stenosis performed with NASCET criteria.     FINDINGS:  3 vessel aortic arch appears normal in course and caliber, only  partially visualized.     Bilateral common and internal carotid arteries are patent and normal  caliber. No evidence of critical stenosis, dissection or aneurysm.     Bilateral vertebral arteries are patent and normal caliber. Basilar  artery patent and normal caliber. No evidence of critical stenosis,  aneurysm or dissection.     The aerodigestive tract is within normal limits of appearance.     No cervical adenopathy by size or morphologic criteria.     Major salivary glands within normal limits of appearance.     Normal CT appearance of the thyroid.     Left maxillary sinus polyp or mucocele. Remaining paranasal sinuses  clear.  No mastoid effusion or density in the external auditory canal.   Orbits are unremarkable. A few missing teeth, which appear to be a  chronic finding.     No acute or suspicious bony finding.     Lung apices clear.          Impression:      1. Grading of carotid stenosis performed with NASCET criteria.  2. No critical stenosis, dissection or aneurysm in the neck.  3. See separately dictated CTA head of the same day.  This report was finalized on 03/21/2021 09:09 by Dr Catina Ames MD.    CT Angiogram Head [[id number]] Collected: 03/21/21 0905     Updated: 03/21/21 0912    Narrative:      EXAM:  CT ANGIOGRAM HEAD- - 3/21/2021 8:44 AM CDT     HISTORY: Carotid artery stenosis; R13.10-Dysphagia, unspecified       COMPARISON: 3/20/2021.      DOSE LENGTH PRODUCT: 221 mGy cm. Automated exposure control was also  utilized to decrease patient radiation dose.     TECHNIQUE: CTA head performed with intravenous contrast. 3D  postprocessing, including MIPs, performed and images saved to PACS.     FINDINGS:   Right internal carotid, anterior cerebral and middle cerebral arteries  are patent. No evidence of stenosis or aneurysm.      Left internal carotid artery, anterior cerebral and middle cerebral  arteries are patent. No evidence of stenosis or aneurysm.     Visualized portion of the vertebral arteries, basilar artery and  posterior branch vessels are patent. No evidence of stenosis or  aneurysm.     Hypodensity at the left basal ganglia correlating with area of acute  infarct on recent brain MRI 3/20/2021. No midline shift. Similar  configuration of the ventricles, sulci and basilar cisterns. Limited  evaluation for intracranial hemorrhage.     Small mucous retention cyst or polyp in the left maxillary sinus.  Orbits, paranasal sinuses, mastoid air cells and external auditory  canals otherwise within normal limits.           Impression:      1. Hypodensity in the left basal ganglia, which correlates with area of  acute infarct on recent brain MRI 3/20/2021.  2. No evidence of critical stenosis or aneurysm of the intracranial  vessels.  3. See separately dictated recent CTA neck.  This report was finalized on 03/21/2021 09:09 by Dr Catina Ames MD.    MRI Brain Without Contrast [[id number]] Collected: 03/20/21 1808     Updated: 03/20/21 1816    Narrative:      HISTORY: Neurologic deficit     MRI BRAIN: Multiplanar imaging the brain is performed without contrast.     There is diffusion restriction involving the left basal ganglia  extending into the periventricular white matter consistent with  acute/subacute MCA  ischemia. There are increased T2 FLAIR signal changes  within the region of diffusion restriction. There is no intracranial  hemorrhage identified. No additional diffusion restriction. Ventricles  are normal in size and configuration. No midline shifting. No abnormal  extra-axial fluid collection.     There are appropriate flow-voids within the distal internal carotid and  basilar arteries.       Impression:      1. Acute nonhemorrhagic ischemia involving the left basal ganglia and  periventricular soft tissues. Findings discussed with Liza nurse  caring for the patient on the 3A [facility], at 6:12 PM on 3/20/2021  This report was finalized on 03/20/2021 18:13 by Dr. Essie Ramirez MD.    US Carotid Bilateral [[id number]] Resulted: 03/20/21 1728     Updated: 03/20/21 1731        Results for orders placed during the hospital encounter of 03/19/21    Adult Transthoracic Echo Complete W/ Cont if Necessary Per Protocol (With Agitated Saline)    Interpretation Summary  · Left ventricular wall thickness is consistent with mild concentric hypertrophy.  · Left ventricular ejection fraction appears to be 61 - 65%. Left ventricular systolic function is normal.  · Left ventricular diastolic function was normal.  · There is a small (<1cm) pericardial effusion. The effusion is fluid filled. There is no evidence of cardiac tamponade.  · Estimated right ventricular systolic pressure from tricuspid regurgitation is normal (<35 mmHg).  · No evidence of a patent foramen ovale. Saline test results are negative.    Hospital Course:  Mr. Lizama is a 40-year-old -American male who does not endorse any significant past medical history nor does he follow regularly with a primary care provider.  The patient lives in [place], and is in the local area doing some work.  The patient presented to the [facility] emergency department on 3/19/2021 with speech difficulties for approximately 24 hours.  CT of the head at the outlying facility  reported low-density change at the left internal capsule and left periventricular white matter suspicious for acute infarct.  Patient was transferred to our facility for higher level of care.  He was admitted to the hospitalist service for further evaluation and management.    MRI of the brain confirmed acute nonhemorrhagic ischemia involving the left basal ganglia and periventricular soft tissues.  Transthoracic echocardiogram shows a small pericardial effusion without evidence of cardiac tamponade.  No evidence of a patent alvarado ovale.  Normal diastolic function with ejection fraction of 61 to 65%.  Carotid ultrasound preliminary report showed 50 to 69% stenosis bilaterally.  CTA of the head neck however, showed no evidence of critical stenosis, dissection, or aneurysm.      The patient was evaluated by neurology.  He has been placed on aspirin daily.  His LDL cholesterol is 122 with goal less than 70.  He has been placed on Lipitor.  Hemoglobin A1c is 6%.  He does report he had upper respiratory Covid symptoms a couple of months ago but was never tested.  Hypercoagulable work-up is pending.  No ectopy has been noted on telemetry, patient does experience bradycardia mostly at night.  Strongly suspect obstructive sleep apnea.  This was discussed with the patient he is aware he needs to pursue outpatient work-up for this.  He will be sent home with a Zio patch for 14 days.    The patient has been ambulatory ad dianna, will not have any acute needs from physical or occupational therapy.  He will need outpatient speech therapy for expressive aphasia.  Overall, the patient is hemodynamically stable and appropriate for discharge today.  We have advised during his long car trip home he should stop every couple of hours to stretch and ambulate.  Patient is aware he needs to establish care with a primary care provider in his local area, who will need to make a referral to a local neurologist.    Condition on Discharge:   Medically stable    Physical Exam on Discharge:  /88 (BP Location: Right arm, Patient Position: Lying)   Pulse 65   Temp 97.9 °F (36.6 °C) (Oral)   Resp 18   Wt (!) 175 kg (385 lb 9.6 oz)   SpO2 97%   Physical Exam  Vitals and nursing note reviewed.   Constitutional:       General: He is not in acute distress.     Appearance: He is obese. He is not ill-appearing.   HENT:      Head: Normocephalic and atraumatic.   Neck:      Comments: Sinus bradycardia-sinus 45-69 overnight, heart rate as low as 31.  Cardiovascular:      Rate and Rhythm: Normal rate and regular rhythm.      Pulses: Normal pulses.      Heart sounds: Normal heart sounds.   Pulmonary:      Effort: Pulmonary effort is normal.      Breath sounds: Normal breath sounds. No wheezing, rhonchi or rales.   Abdominal:      General: Bowel sounds are normal. There is no distension.      Palpations: Abdomen is soft.      Tenderness: There is no abdominal tenderness.   Musculoskeletal:         General: No swelling or tenderness.      Cervical back: Normal range of motion and neck supple. No tenderness.   Skin:     General: Skin is warm and dry.      Findings: No erythema or rash.   Neurological:      Mental Status: He is alert.      Comments: Right upper extremity distal weakness improved today. Word finding difficulty at times.   Psychiatric:      Comments: flat     Discharge Disposition:  Home or Self Care    Discharge Medications:     Discharge Medications      New Medications      Instructions Start Date   aspirin 81 MG chewable tablet   81 mg, Oral, Daily   Start Date: March 22, 2021     atorvastatin 80 MG tablet  Commonly known as: LIPITOR   80 mg, Oral, Nightly           Discharge Diet:   Diet Instructions     Diet: Regular, Cardiac; Thin      Discharge Diet:  Regular  Cardiac       Fluid Consistency: Thin        Activity at Discharge:   Activity Instructions     Activity as Tolerated          Discharge Care Plan/Instructions:   1.  Return for any  acute or worsening symptoms.  2.  New medications aspirin and atorvastatin.  3.  14-day Zio patch.  4.  Outpatient speech therapy.  5.  Outpatient work-up for sleep apnea.    Follow-up Appointments:   1.  Patient will need to establish care with a local primary care provider in his area, who will need to make a referral to a neurologist.    Test Results Pending at Discharge: Numerous hypercoagulable labs pending.    Electronically signed by MIGDALIA Samuel, 3/21/2021, 14:14 CDT.    Time: 40 minutes

## 2021-03-21 NOTE — PLAN OF CARE
Goal Outcome Evaluation:  Plan of Care Reviewed With: patient  Progress: improving  Outcome Summary: Pt being d/c home with family. Safety maintained. No neuro changes this shift. PT voiding per urinal. D/c instructions given to patient and family. Instructions provided regarding mychart sign up. Informed pt after mailing zio patch back to monitor mychart for results and follow up with a primary doctor local to his area. Also instructed pt to follow up in his area regarding applying for medicaid. NIH-3. No neuro changes noted this shift. Stable at d/c.

## 2021-03-21 NOTE — PROGRESS NOTES
Neurology Progress Note      Date of admission: 3/19/2021 11:55 PM  Date of visit: 3/21/2021    Chief Complaint:  F/u stroke    Subjective     Subjective:    Patient still with aphasia but can understand what is said just cannot get much words out  Medications:  Current Facility-Administered Medications   Medication Dose Route Frequency Provider Last Rate Last Admin   • acetaminophen (TYLENOL) tablet 650 mg  650 mg Oral Q4H PRN Gerardo Nicholson MD        Or   • acetaminophen (TYLENOL) suppository 650 mg  650 mg Rectal Q4H PRN Gerardo Nicholson MD       • aspirin chewable tablet 81 mg  81 mg Oral Daily Gerardo Nicholson MD   81 mg at 03/21/21 0858    Or   • aspirin suppository 300 mg  300 mg Rectal Daily Gerardo Nicholson MD       • atorvastatin (LIPITOR) tablet 80 mg  80 mg Oral Nightly Gerardo Nicholson MD   80 mg at 03/20/21 2006   • labetalol (NORMODYNE,TRANDATE) injection 20 mg  20 mg Intravenous Q10 Min PRN Gerardo Nicholson MD       • sodium chloride 0.9 % flush 10 mL  10 mL Intravenous Q12H Gerardo Nicholson MD   10 mL at 03/21/21 0858   • sodium chloride 0.9 % flush 10 mL  10 mL Intravenous PRN Gerardo Nicholson MD           Review of Systems:   -A 14 point review of systems is completed and is negative except for speech difficulties    Objective     Objective      Vital Signs  Temp:  [97.4 °F (36.3 °C)-98.1 °F (36.7 °C)] 97.9 °F (36.6 °C)  Heart Rate:  [62-68] 65  Resp:  [16-18] 18  BP: (124-160)/(59-88) 160/88    Physical Exam:    HEENT:  Neck supple  CVS:  Regular rate and rhythm.  No murmurs  Carotid Examination:  No bruits  Lungs:  Clear to auscultation  Abdomen:  Non-tender, Non-distended  Extremities:  No signs of peripheral edema    Neurologic Exam:    -Awake, Alert, Oriented X 3  - aphasia but can comprehend speech  -No dysarthria  -Follows simple  commands    Cranial nerves II through XII intact.  CN II:  Visual fields full.  Pupils equally  reactive to light  CN III, IV, VI:  Extraocular Muscles full with no signs of nystagmus  CN V:  Facial sensory is symmetric   CN VII:  Facial motor symmetric  CN VIII:  Gross hearing intact bilaterally  CN IX/X:  Palate elevates symmetrically  CN XI:  Shoulder shrug symmetric  CN XII:  Tongue is midline on protrusion    Motor: (strength out of 5:  1= minimal movement, 2 = movement in plane of gravity, 3 = movement against gravity, 4 = movement against some resistance, 5 = full strength)    -Right Upper Ext: Proximal: 5 Distal: 5  -Left Upper Ext: Proximal: 5 Distal: 5    -Right Lower Ext: Proximal: 5 Distal: 5  -Left Lower Ext: Proximal: 5 Distal: 5    DTR:  2+ throughout in all four extremities    Sensory:  -Intact to light touch    Coordination/Gait:  -No ataxia     Results Review:    I reviewed the patient's new clinical results.    Lab Results (last 24 hours)     Procedure Component Value Units Date/Time    Vitamin B12 [[id number]]  (Normal) Collected: 03/21/21 0536    Specimen: Blood Updated: 03/21/21 1221     Vitamin B-12 362 pg/mL     Narrative:      Results may be falsely increased if patient taking Biotin.      Folate [[id number]]  (Normal) Collected: 03/21/21 0536    Specimen: Blood Updated: 03/21/21 1221     Folate 5.76 ng/mL     Narrative:      Results may be falsely increased if patient taking Biotin.      Homocysteine [[id number]]  (Normal) Collected: 03/21/21 0536    Specimen: Blood Updated: 03/21/21 1213     Homocysteine, Plasma (Quant) 10.9 umol/L     Antithrombin III [[id number]]  (Normal) Collected: 03/21/21 0536    Specimen: Blood Updated: 03/21/21 0623     Antithrombin Activity 99 %     D-dimer, Quantitative [[id number]]  (Normal) Collected: 03/21/21 0536    Specimen: Blood Updated: 03/21/21 0615     D-Dimer, Quantitative 0.25 mg/L (FEU)     Narrative:      Reference Range is 0-0.50 mg/L FEU. However, results <0.50 mg/L FEU tends to rule out DVT or PE. Results >0.50 mg/L FEU are not useful in  predicting absence or presence of DVT or PE.      Factor 5 Leiden [[id number]] Collected: 03/21/21 0536    Specimen: Blood Updated: 03/21/21 0544    Factor II, DNA Analysis [[id number]] Collected: 03/21/21 0536    Specimen: Blood Updated: 03/21/21 0544    Protein S Functional [[id number]] Collected: 03/21/21 0536    Specimen: Blood Updated: 03/21/21 0544    Protein C Activity [[id number]] Collected: 03/21/21 0536    Specimen: Blood Updated: 03/21/21 0544    Protein S Antigen, Free [[id number]] Collected: 03/21/21 0536    Specimen: Blood Updated: 03/21/21 0544    Protein S Antigen, Total [[id number]] Collected: 03/21/21 0536    Specimen: Blood Updated: 03/21/21 0544    Protein C Antigen, Total [[id number]] Collected: 03/21/21 0536    Specimen: Blood Updated: 03/21/21 0543    Lupus Anticoagulant [[id number]] Collected: 03/21/21 0536    Specimen: Blood Updated: 03/21/21 0543    Beta-2 Glycoprotein Antibodies [[id number]] Collected: 03/21/21 0536    Specimen: Blood Updated: 03/21/21 0543    Anticardiolipin Antibody, IgG / M, Qn [[id number]] Collected: 03/21/21 0536    Specimen: Blood Updated: 03/21/21 0543    ISIDRA [[id number]] Collected: 03/21/21 0536    Specimen: Blood Updated: 03/21/21 0543        Imaging Results (Last 24 Hours)     Procedure Component Value Units Date/Time    CT Angiogram Neck [[id number]] Collected: 03/21/21 0904     Updated: 03/21/21 0912    Narrative:      EXAM: CT ANGIOGRAM NECK- - 3/21/2021 8:44 AM CDT     HISTORY: Carotid artery stenosis; R13.10-Dysphagia, unspecified       COMPARISON: 3/28/2021.      DOSE LENGTH PRODUCT: 222 mGy cm. Automated exposure control was also  utilized to decrease patient radiation dose.     TECHNIQUE: CT images of the neck performed with contrast. 3D  postprocessing, including MIPs, performed and images saved to PACS.     Grading of carotid stenosis performed with NASCET criteria.     FINDINGS:  3 vessel aortic arch appears normal in course and caliber, only  partially  visualized.     Bilateral common and internal carotid arteries are patent and normal  caliber. No evidence of critical stenosis, dissection or aneurysm.     Bilateral vertebral arteries are patent and normal caliber. Basilar  artery patent and normal caliber. No evidence of critical stenosis,  aneurysm or dissection.     The aerodigestive tract is within normal limits of appearance.     No cervical adenopathy by size or morphologic criteria.     Major salivary glands within normal limits of appearance.     Normal CT appearance of the thyroid.     Left maxillary sinus polyp or mucocele. Remaining paranasal sinuses  clear.  No mastoid effusion or density in the external auditory canal.   Orbits are unremarkable. A few missing teeth, which appear to be a  chronic finding.     No acute or suspicious bony finding.     Lung apices clear.          Impression:      1. Grading of carotid stenosis performed with NASCET criteria.  2. No critical stenosis, dissection or aneurysm in the neck.  3. See separately dictated CTA head of the same day.  This report was finalized on 03/21/2021 09:09 by Dr Catina Ames MD.    CT Angiogram Head [[id number]] Collected: 03/21/21 0905     Updated: 03/21/21 0912    Narrative:      EXAM: CT ANGIOGRAM HEAD- - 3/21/2021 8:44 AM CDT     HISTORY: Carotid artery stenosis; R13.10-Dysphagia, unspecified       COMPARISON: 3/20/2021.      DOSE LENGTH PRODUCT: 221 mGy cm. Automated exposure control was also  utilized to decrease patient radiation dose.     TECHNIQUE: CTA head performed with intravenous contrast. 3D  postprocessing, including MIPs, performed and images saved to PACS.     FINDINGS:   Right internal carotid, anterior cerebral and middle cerebral arteries  are patent. No evidence of stenosis or aneurysm.      Left internal carotid artery, anterior cerebral and middle cerebral  arteries are patent. No evidence of stenosis or aneurysm.     Visualized portion of the vertebral arteries,  basilar artery and  posterior branch vessels are patent. No evidence of stenosis or  aneurysm.     Hypodensity at the left basal ganglia correlating with area of acute  infarct on recent brain MRI 3/20/2021. No midline shift. Similar  configuration of the ventricles, sulci and basilar cisterns. Limited  evaluation for intracranial hemorrhage.     Small mucous retention cyst or polyp in the left maxillary sinus.  Orbits, paranasal sinuses, mastoid air cells and external auditory  canals otherwise within normal limits.           Impression:      1. Hypodensity in the left basal ganglia, which correlates with area of  acute infarct on recent brain MRI 3/20/2021.  2. No evidence of critical stenosis or aneurysm of the intracranial  vessels.  3. See separately dictated recent CTA neck.  This report was finalized on 03/21/2021 09:09 by Dr Catina Ames MD.        Results for orders placed during the hospital encounter of 03/19/21    Adult Transthoracic Echo Complete W/ Cont if Necessary Per Protocol (With Agitated Saline)    Interpretation Summary  · Left ventricular wall thickness is consistent with mild concentric hypertrophy.  · Left ventricular ejection fraction appears to be 61 - 65%. Left ventricular systolic function is normal.  · Left ventricular diastolic function was normal.  · There is a small (<1cm) pericardial effusion. The effusion is fluid filled. There is no evidence of cardiac tamponade.  · Estimated right ventricular systolic pressure from tricuspid regurgitation is normal (<35 mmHg).  · No evidence of a patent foramen ovale. Saline test results are negative.      Telemetry: sinus 45 to 69  Down to 31 2.28 second pause  61 to 86    Assessment/Plan     Hospital Problem List      Stroke (CMS/HCC) left internal capsule and periventricular white matter    Morbid obesity (CMS/HCC)    Expressive aphasia    Mixed hyperlipidemia    Impression:  1. Acute left BG/PVWM stroke  2. Sinus arrhythmia/bradycardia  night before last but now sinus/sinus sarahi but  had 2.21 second pause and heart rate down to 31 during sleep --may be due to sleep apnea  3. Sleep disordered breathing  4. Mixed hyperlipidemia  Plan:  Zio Patch for 14 days  Have him sign up to my chart to be able to get results of tests since he lives in [facility]  Aspirin  Lipitor  Polysomnogram as outpatient  Replace B12, folate  Await remainder of pending hypercoagulable work up (see above)         Maida Tyler MD  03/21/21  14:55 CDT

## 2021-03-21 NOTE — PLAN OF CARE
Goal Outcome Evaluation:  Plan of Care Reviewed With: patient  Progress: no change  Outcome Summary: Pt had no c/o pain this shift. Safety maintained. IV IID. Tele on.  in place. O2 at 2 liters. Pt tolerating regular diet with thin liquids. No neuro changes this shift. NIH-3. Echo and carotids results pending. MRI results reported to Dr. Arabella Dodson. Voiding per urinal. Pt up with assist x1. Cont to monitor.

## 2021-03-22 ENCOUNTER — READMISSION MANAGEMENT (OUTPATIENT)
Dept: CALL CENTER | Facility: HOSPITAL | Age: 41
End: 2021-03-22

## 2021-03-22 LAB
F5 GENE MUT ANL BLD/T: NORMAL
FACTOR II, DNA ANALYSIS: NORMAL

## 2021-03-22 NOTE — OUTREACH NOTE
Prep Survey      Responses   [facility]?  [facility]   Is LACE score < 7 ?  Yes   Emergency Room discharge w/ pulse ox?  No   Eligibility  Readm Mgmt   Discharge diagnosis  Acute left BG/IC/PVWM  Stroke   Does the patient have one of the following disease processes/diagnoses(primary or secondary)?  Stroke (TIA)   Does the patient have Home health ordered?  No   Is there a DME ordered?  No   Medication alerts for this patient  ASA    Prep survey completed?  Yes          Yolanda Ramirez RN

## 2021-03-22 NOTE — THERAPY DISCHARGE NOTE
Acute Care - Speech Language Pathology Discharge Summary  [facility]       Patient Name: Ravi Lizama  : 1980  MRN: [id number]    Today's Date: 3/22/2021                   Admit Date: 3/19/2021      SLP Recommendation and Plan  Regular diet with thin liquids.  Johann Rocha MS CCC-SLP 3/22/2021 14:09 CDT    Visit Dx:    ICD-10-CM ICD-9-CM   1. Dysphagia, unspecified type  R13.10 787.20   2. Expressive aphasia  R47.01 784.3               SLP GOALS     Row Name 21 1400 21 0820          Oral Nutrition/Hydration Goal 1 (SLP)    Oral Nutrition/Hydration Goal 1, SLP  --  Pt will tolerate LRD without overt s/s of aspiration.  -TM     Time Frame (Oral Nutrition/Hydration Goal 1, SLP)  --  by discharge  -TM     Barriers (Oral Nutrition/Hydration Goal 1, SLP)  --  n/a  -TM     Progress/Outcomes (Oral Nutrition/Hydration Goal 1, SLP)  goal not met;discharged from facility  -  goal ongoing  -TM       User Key  (r) = Recorded By, (t) = Taken By, (c) = Cosigned By    Initials Name Provider Type    TM Kita Bray CCC-SLP Speech and Language Pathologist     Johann Rocha MS CCC-SLP Speech and Language Pathologist                  SLP Discharge Summary  Anticipated Discharge Disposition (SLP): unknown  Reason for Discharge: discharge from this facility  Progress Toward Achieving Short/long Term Goals: goals not met within established timelines  Discharge Destination: home      Johann Rocha MS CCC-SLP  3/22/2021

## 2021-03-23 LAB
ANA SER QL: NEGATIVE
CARDIOLIPIN IGG SER IA-ACNC: <9 GPL U/ML (ref 0–14)
CARDIOLIPIN IGM SER IA-ACNC: <9 MPL U/ML (ref 0–12)

## 2021-03-24 ENCOUNTER — READMISSION MANAGEMENT (OUTPATIENT)
Dept: CALL CENTER | Facility: HOSPITAL | Age: 41
End: 2021-03-24

## 2021-03-24 LAB
APTT SCREEN TO CONFIRM RATIO: 1.12 RATIO (ref 0–1.4)
CONFIRM APTT/NORMAL: 44.3 SEC (ref 0–55)
LA 2 SCREEN W REFLEX-IMP: NORMAL
PROT C ACT/NOR PPP CHRO: 119 %
PROT C AG ACT/NOR PPP IA: 123 % (ref 60–150)
PROT S ACT/NOR PPP: 104 % (ref 63–140)
PROT S AG ACT/NOR PPP IA: 87 % (ref 60–150)
PROT S FREE AG ACT/NOR PPP IA: 111 % (ref 57–157)
SCREEN APTT: 37 SEC (ref 0–51.9)
SCREEN DRVVT: 46.1 SEC (ref 0–47)
THROMBIN TIME: 17.8 SEC (ref 0–23)

## 2021-03-24 NOTE — OUTREACH NOTE
Stroke Week 1 Survey      Responses   [facility] patient discharged from?  [facility]   Does the patient have one of the following disease processes/diagnoses(primary or secondary)?  Stroke (TIA)   Week 1 attempt successful?  Yes   Call start time  1213   Call end time  1217   Discharge diagnosis  Acute left BG/IC/PVWM  Stroke   Is patient permission given to speak with other caregiver?  Yes   List who call center can speak with  [facility]   Person spoke with today (if not patient) and relationship  Lynne- SO   Meds reviewed with patient/caregiver?  Yes   Is the patient having any side effects they believe may be caused by any medication additions or changes?  No   Does the patient have all medications ordered at discharge?  Yes   Is the patient taking all medications as directed (includes completed medication regime)?  Yes   Does the patient have a primary care provider?   Yes   Does the patient have an appointment with their PCP within 7 days of discharge?  Yes   Has the patient kept scheduled appointments due by today?  N/A   Psychosocial issues?  No   Does the patient require any assistance with activities of daily living such as eating, bathing, dressing, walking, etc.?  No   Does the patient have any residual symptoms from stroke/TIA?  Yes   Residual symptoms comments  Still has some speech issues   Does the patient understand the diet ordered at discharge?  Yes   Did the patient receive a copy of their discharge instructions?  Yes   Nursing interventions  Reviewed instructions with patient   What is the patient's perception of their health status since discharge?  Improving   Nursing interventions  Nurse provided patient education   Is the patient able to teach back FAST for Stroke?  Yes   Is the patient/caregiver able to teach back the risk factors for a stroke?  High Cholesterol, Sleep apnea   Is the patient/caregiver able to teach back signs and symptoms related to disease process for when to call PCP?   Yes   Is the patient/caregiver able to teach back signs and symptoms related to disease process for when to call 911?  Yes   If the patient is a current smoker, are they able to teach back resources for cessation?  Not a smoker   Is the patient/caregiver able to teach back the hierarchy of who to call/visit for symptoms/problems? PCP, Specialist, Home health nurse, Urgent Care, ED, 911  Yes   Week 1 call completed?  Yes          Lauren Espinosa RN

## 2021-03-26 LAB
B2 GLYCOPROT1 IGA SER-ACNC: <9 GPI IGA UNITS (ref 0–25)
B2 GLYCOPROT1 IGG SER-ACNC: <9 GPI IGG UNITS (ref 0–20)
B2 GLYCOPROT1 IGM SER-ACNC: <9 GPI IGM UNITS (ref 0–32)

## 2021-04-12 ENCOUNTER — TELEPHONE (OUTPATIENT)
Dept: CARDIOLOGY | Facility: CLINIC | Age: 41
End: 2021-04-12

## 2022-12-01 NOTE — CONSULTS
Neurology Consult Note    Patient:  Ravi Lizama   YOB: 1980  MRN:  [id number]  Date of Admission:  3/19/2021 11:55 PM    Date: 3/20/2021    Referring Provider: Gerardo Nicholson MD  Reason for Consultation: Stroke      History of present illness:     This is a 40 y.o. right handed  male with H/O  hyperlipidemia, nonsmoker evaluated for stroke    Patient walked into [facility]  with inability to speak but no right sided weakness per the ED provider there  Last known well is unknown but on March 18  he was face timed by his girlfriend who noted that he was not answering her verbally and did not make any sense when he did answer and yesterday while at work he was dropping things and unable to function properly at work so came to [facility] ED..   Laboratory studies at [facility] on 3/19/2021 included a CPK of 634 unremarkable troponin and elevated LDH at 236 normal glucose at 91 normal BUN and creatinine normal sodium his potassium was 3.5 liver functions were all unremarkable calcium was 9.3 serum osmole was 280 his CBC showed a hemoglobin of 12.6 which is low but mildly so with but a normal hematocrit 39 and a white count normal at 6200 normal platelet count of 230,000 PT/INR were normal PT normal at 24.4 urine drug screen was negative for amphetamines, cocaine, THC, benzo's, tricyclic's, barbiturates, MDMA, opiates, PCP, oxycodone , PPx head CT was reported as a low density abnormality in the left internal capsule and left periventricular white matter     Patient was transferred here for higher level of care He continues to have speech difficulties  Of note he denies any family history of early stroke or MI and denies any street drugs such as amphetamines or cocaine.   However he states yes to Covid like symptoms of cough and fever and chills about 2 months ago but states he never got tested.  He went to work with them and did feels some  "fatigue but not much.  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     Today:    Hemoglobin A1C 6.0  Carotid dopplers preliminary reported of bilateral ICA stenosis at 50 to 69% with antegrade flow of vertebral arteries  Telemetry: sinus arrhythmia/sinus 56 to 84  Last night sinus 39 to 59 with first degree heart block and PVC's    Patient had documented hypertension of 180/94 and repeat the next day of 165/73 in 12/2018   Also in December 2018 LDL was 138, total cholesterol 191 and HDL of only 34   He also had hypoglycemia then of only 60.     History reviewed. No pertinent past medical history.    History reviewed. No pertinent surgical history.    Prior to Admission medications    Not on File       Hospital scheduled medications:   aspirin, 81 mg, Oral, Daily   Or  aspirin, 300 mg, Rectal, Daily  atorvastatin, 80 mg, Oral, Nightly  sodium chloride, 10 mL, Intravenous, Q12H      Hospital PRN medications:  •  acetaminophen **OR** acetaminophen  •  labetalol  •  sodium chloride    No Known Allergies    Social History     Socioeconomic History   • Marital status: Single     Spouse name: Not on file   • Number of children: Not on file   • Years of education: Not on file   • Highest education level: Not on file     History reviewed. Family history  Hypertension of maternal and paternal sides    Review of Systems  A 14 point review of systems was unobtainable as he was unable to communicate but answered edward all questions such as headache, pain, any falls, any street drugs, numbness, vision changes,  Gait problems, SOB, chest pain, cough, fever, etc    Vital Signs   Temp:  [97.7 °F (36.5 °C)-98.2 °F (36.8 °C)] 97.7 °F (36.5 °C)  Heart Rate:  [60-85] 71  Resp:  [18-20] 18  BP: (124-156)/(56-77) 150/76    General Exam:  Head:  Normal cephalic, atraumatic  HEENT:  Neck supple  Fundoscopic Exam:  No signs of disc " edema  CVS:  Regular rate and rhythm.  No murmurs  Carotid Examination:  No bruits  Lungs:  Clear to auscultation  Abdomen:  Non-tender, Non-distended  Extremities:  No signs of peripheral edema  Skin:  No rashes    Neurologic Exam:    Mental Status:    -Awake, Alert, Oriented X 3  -Answers yes and no but cannot repeat words but was able to name object.  Has significant difficulty with forming sentences   -No dysarthria  -Follows simple and complex commands    CN II:  Visual fields full.  Pupils equally reactive to light  CN III, IV, VI:  Extraocular Muscles full with no signs of nystagmus  CN V:  Facial sensory is symmetric   CN VII:  Facial motor symmetric  CN VIII:  Gross hearing intact bilaterally  CN IX/X:  Palate elevates symmetrically  CN XI:  Shoulder shrug symmetric  CN XII:  Tongue is midline on protrusion    Motor: (strength out of 5:  1= minimal movement, 2 = movement in plane of gravity, 3 = movement against gravity, 4 = movement against some resistance, 5 = full strength)    -Right Upper Ext: Right arm drift. Proximal: 5/5 Distal: --probably 5/5 but a questionable slight weakness with interossei  -Left Upper Ext: Proximal: 5 Distal: 5    -Right Lower Ext: Proximal: 5 Distal: 5  -Left Lower Ext: Proximal: 5 Distal: 5    DTR:  -Right   Bicep: 2+ Triceps: 2+ Brachioradialis: 2+   Patella: 2+ Ankle: 2+ Neg Babinski  -Left   Bicep: 2+ Triceps: 2+ Brachioradialis: 2+   Patella: 2+ Ankle: 2+ Neg Babinski    Sensory:  -Intact to light touch, pinprick, temperature, pain, and proprioception    Coordination:  -Finger to nose intact  -Heel to shin intact  -No ataxia    Gait  -Not tested for safety reasons      Results Review:  Lab Results (last 7 days)     Procedure Component Value Units Date/Time    Urine Drug Screen - Urine, Clean Catch [[id number]]  (Normal) Collected: 03/20/21 0628    Specimen: Urine, Clean Catch Updated: 03/20/21 0648     THC, Screen, Urine Negative     Phencyclidine (PCP), Urine Negative      Cocaine Screen, Urine Negative     Methamphetamine, Ur Negative     Opiate Screen Negative     Amphetamine Screen, Urine Negative     Benzodiazepine Screen, Urine Negative     Tricyclic Antidepressants Screen Negative     Methadone Screen, Urine Negative     Barbiturates Screen, Urine Negative     Oxycodone Screen, Urine Negative     Propoxyphene Screen Negative     Buprenorphine, Screen, Urine Negative    Narrative:      Cutoff For Drugs Screened:    Amphetamines               500 ng/ml  Barbiturates               200 ng/ml  Benzodiazepines            150 ng/ml  Cocaine                    150 ng/ml  Methadone                  200 ng/ml  Opiates                    100 ng/ml  Phencyclidine               25 ng/ml  THC                            50 ng/ml  Methamphetamine            500 ng/ml  Tricyclic Antidepressants  300 ng/ml  Oxycodone                  100 ng/ml  Propoxyphene               300 ng/ml  Buprenorphine               10 ng/ml    The normal value for all drugs tested is negative. This report includes unconfirmed screening results, with the cutoff values listed, to be used for medical treatment purposes only.  Unconfirmed results must not be used for non-medical purposes such as employment or legal testing.  Clinical consideration should be applied to any drug of abuse test, particularly when unconfirmed results are used.      Comprehensive Metabolic Panel [[id number]]  (Abnormal) Collected: 03/20/21 0428    Specimen: Blood Updated: 03/20/21 0601     Glucose 112 mg/dL      BUN 11 mg/dL      Creatinine 0.94 mg/dL      Sodium 141 mmol/L      Potassium 4.2 mmol/L      Comment: Slight hemolysis detected by analyzer. Results may be affected.        Chloride 106 mmol/L      CO2 27.0 mmol/L      Calcium 9.0 mg/dL      Total Protein 6.7 g/dL      Albumin 3.70 g/dL      ALT (SGPT) 37 U/L      AST (SGOT) 35 U/L      Comment: Slight hemolysis detected by analyzer. Results may be affected.        Alkaline Phosphatase  51 U/L      Total Bilirubin 0.7 mg/dL      eGFR Non African Amer 89 mL/min/1.73      Globulin 3.0 gm/dL      A/G Ratio 1.2 g/dL      BUN/Creatinine Ratio 11.7     Anion Gap 8.0 mmol/L     Narrative:      GFR Normal >60  Chronic Kidney Disease <60  Kidney Failure <15      Lipid Panel [[id number]]  (Abnormal) Collected: 03/20/21 0428    Specimen: Blood Updated: 03/20/21 0534     Total Cholesterol 178 mg/dL      Triglycerides 147 mg/dL      HDL Cholesterol 29 mg/dL      LDL Cholesterol  122 mg/dL      VLDL Cholesterol 27 mg/dL      LDL/HDL Ratio 4.12    Narrative:      Cholesterol Reference Ranges  (U.S. Department of Health and Human Services ATP III Classifications)    Desirable          <200 mg/dL  Borderline High    200-239 mg/dL  High Risk          >240 mg/dL      Triglyceride Reference Ranges  (U.S. Department of Health and Human Services ATP III Classifications)    Normal           <150 mg/dL  Borderline High  150-199 mg/dL  High             200-499 mg/dL  Very High        >500 mg/dL    HDL Reference Ranges  (U.S. Department of Health and Human Services ATP III Classifications)    Low     <40 mg/dl (major risk factor for CHD)  High    >60 mg/dl ('negative' risk factor for CHD)        LDL Reference Ranges  (U.S. Department of Health and Human Services ATP III Classifications)    Optimal          <100 mg/dL  Near Optimal     100-129 mg/dL  Borderline High  130-159 mg/dL  High             160-189 mg/dL  Very High        >189 mg/dL    Hemoglobin A1c [[id number]]  (Abnormal) Collected: 03/20/21 0428    Specimen: Blood Updated: 03/20/21 0522     Hemoglobin A1C 6.00 %     Narrative:      Hemoglobin A1C Ranges:    Increased Risk for Diabetes  5.7% to 6.4%  Diabetes                     >= 6.5%  Diabetic Goal                < 7.0%    CBC (No Diff) [[id number]]  (Abnormal) Collected: 03/20/21 0428    Specimen: Blood Updated: 03/20/21 0514     WBC 5.58 10*3/mm3      RBC 4.22 10*6/mm3      Hemoglobin 11.6 g/dL      Hematocrit  37.3 %      MCV 88.4 fL      MCH 27.5 pg      MCHC 31.1 g/dL      RDW 13.8 %      RDW-SD 44.6 fl      MPV 9.6 fL      Platelets 204 10*3/mm3     POC Glucose Once [[id number]]  (Normal) Collected: 03/20/21 0147    Specimen: Blood Updated: 03/20/21 0158     Glucose 104 mg/dL      Comment: : PRISCILLA [employer] ID: [id number]             .  Imaging Results (Last 7 Days)     ** No results found for the last 168 hours. **          MRI brain personally reviewed and shows acute left BG/genu of internal capsule and periventricular white matter stroke:  DWI        Impression    1. Acute left BG/IC/PVWM  Stroke  2. Bilateral carotid artery stenosis  3. Aphasia-subcortical with striato-capsular aphasia  4. Hyperlipidemia with LDL of 122 and GOAL OF < 70  5.  Sleep disordered breathing--He snore and may snort himself awake  6. Sinus arrhythmia and bradycardia last night    Patient reports Covid like symptoms for which he did not get tested at the time which was about 2 months ago. Covid may cause a hypercoag state, abnormal platelet function    Plan  · Hypercoag work up in AM  · B12, folate, TSH  · Speech therapy  · OT for the ? Right weakness --very distal  · Would benefit from CTA   · Overnight sleep pulse oximetry    I discussed the patients findings and my recommendations with patient and nursing staff    Maida Tyler MD  03/20/21  15:56 CDT     DISPLAY PLAN FREE TEXT